# Patient Record
Sex: FEMALE | Race: WHITE | URBAN - METROPOLITAN AREA
[De-identification: names, ages, dates, MRNs, and addresses within clinical notes are randomized per-mention and may not be internally consistent; named-entity substitution may affect disease eponyms.]

---

## 2020-04-15 ENCOUNTER — OFFICE VISIT (OUTPATIENT)
Dept: OBGYN CLINIC | Age: 35
End: 2020-04-15
Payer: COMMERCIAL

## 2020-04-15 VITALS
HEART RATE: 64 BPM | WEIGHT: 293 LBS | BODY MASS INDEX: 45.99 KG/M2 | SYSTOLIC BLOOD PRESSURE: 122 MMHG | DIASTOLIC BLOOD PRESSURE: 78 MMHG | HEIGHT: 67 IN

## 2020-04-15 PROCEDURE — 99385 PREV VISIT NEW AGE 18-39: CPT | Performed by: OBSTETRICS & GYNECOLOGY

## 2020-04-15 ASSESSMENT — PATIENT HEALTH QUESTIONNAIRE - PHQ9
SUM OF ALL RESPONSES TO PHQ QUESTIONS 1-9: 0
SUM OF ALL RESPONSES TO PHQ9 QUESTIONS 1 & 2: 0
SUM OF ALL RESPONSES TO PHQ QUESTIONS 1-9: 0
1. LITTLE INTEREST OR PLEASURE IN DOING THINGS: 0
2. FEELING DOWN, DEPRESSED OR HOPELESS: 0

## 2020-04-15 NOTE — PROGRESS NOTES
acne  ?Laser therapy or electrolysis to remove extra hair  Is there anything I can do on my own to treat the condition? -- Yes. If you are overweight or obese, losing weight can improve many of your symptoms. Losing just 5 percent of your body weight can help a lot. That adds up to 10 pounds of weight loss for a 200-pound woman. What if I want to get pregnant? -- Don't lose hope. Most women with PCOS are able to get pregnant, but it can take a while. If you are overweight, losing weight can help make your periods regular and improve your chances of getting pregnant. If you lose weight but your periods are still irregular, your doctor can give you medicines to help you ovulate and improve your chances of getting pregnant. What will my life be like? -- Women with PCOS are able to live normal lives. But it is important to see a doctor. Treatments will help your symptoms and protect you from other diseases. Obesity Counseling:  Given  Smoking Counseling:  N/A  STD counseling: Discussed safe sexual practice in detail    Orders Placed This Encounter   Procedures    C.trachomatis N.gonorrhoeae DNA     Standing Status:   Future     Standing Expiration Date:   4/15/2021     Order Specific Question:   Source: Answer:   Cervical    PAP SMEAR     Standing Status:   Future     Standing Expiration Date:   4/15/2021     Order Specific Question:   Collection Type     Answer: Thin Prep     Order Specific Question:   Prior Abnormal Pap Test     Answer:   No     Order Specific Question:   Screening or Diagnostic     Answer:   Screening     Order Specific Question:   HPV Requested? Answer:   Yes     Order Specific Question:   High Risk Patient     Answer:   N/A       No orders of the defined types were placed in this encounter. Follow up:  Return if symptoms worsen or fail to improve, for next annual exam visit.       Audrie Aschoff, MD

## 2020-04-22 LAB
CHLAMYDIA TRACHOMATIS AMPLIFIED DET: NEGATIVE
N GONORRHOEAE AMPLIFIED DET: NEGATIVE
PAP SMEAR: ABNORMAL

## 2020-05-05 ENCOUNTER — PROCEDURE VISIT (OUTPATIENT)
Dept: OBGYN CLINIC | Age: 35
End: 2020-05-05
Payer: COMMERCIAL

## 2020-05-05 VITALS
DIASTOLIC BLOOD PRESSURE: 76 MMHG | WEIGHT: 293 LBS | HEIGHT: 67 IN | SYSTOLIC BLOOD PRESSURE: 108 MMHG | BODY MASS INDEX: 45.99 KG/M2 | HEART RATE: 72 BPM

## 2020-05-05 DIAGNOSIS — Z11.3 SCREENING FOR STD (SEXUALLY TRANSMITTED DISEASE): ICD-10-CM

## 2020-05-05 LAB
HCG, URINE, POC: NEGATIVE
HEPATITIS B SURFACE ANTIGEN INTERPRETATION: NORMAL
HEPATITIS C ANTIBODY INTERPRETATION: NORMAL
Lab: NORMAL
NEGATIVE QC PASS/FAIL: NORMAL
POSITIVE QC PASS/FAIL: NORMAL

## 2020-05-05 PROCEDURE — 36415 COLL VENOUS BLD VENIPUNCTURE: CPT | Performed by: OBSTETRICS & GYNECOLOGY

## 2020-05-05 PROCEDURE — 57454 BX/CURETT OF CERVIX W/SCOPE: CPT | Performed by: OBSTETRICS & GYNECOLOGY

## 2020-05-05 PROCEDURE — 81025 URINE PREGNANCY TEST: CPT | Performed by: OBSTETRICS & GYNECOLOGY

## 2020-05-05 NOTE — PROGRESS NOTES
Colposcopy Procedure Note    Indications: Pap smear : LGSIL with + HPV . Procedure Details   The risks and benefits of the procedure and Verbal informed consent obtained. Speculum placed in vagina and excellent visualization of cervix achieved by colposcope, cervix swabbed x 3 with acetic acid solution. Findings:  Cervix: visible awl lesion(s) at 10 , 3, 6 , 9  o'clock; specimen labelled and sent to pathology. Specimens: AS ABOVE WITH Endocervical currettings. Complications: none. Plan:  Specimens labelled and sent to Pathology. Follow up:  Return in about 2 weeks (around 5/19/2020) for F/U for results.       René Bravo MD

## 2020-05-06 LAB — RPR: NORMAL

## 2020-05-07 LAB — HIV 1,2 COMBO ANTIGEN/ANTIBODY: NEGATIVE

## 2020-05-19 ENCOUNTER — OFFICE VISIT (OUTPATIENT)
Dept: OBGYN CLINIC | Age: 35
End: 2020-05-19
Payer: COMMERCIAL

## 2020-05-19 VITALS
DIASTOLIC BLOOD PRESSURE: 76 MMHG | SYSTOLIC BLOOD PRESSURE: 110 MMHG | WEIGHT: 293 LBS | HEART RATE: 80 BPM | BODY MASS INDEX: 45.99 KG/M2 | HEIGHT: 67 IN

## 2020-05-19 PROCEDURE — G8417 CALC BMI ABV UP PARAM F/U: HCPCS | Performed by: OBSTETRICS & GYNECOLOGY

## 2020-05-19 PROCEDURE — G8427 DOCREV CUR MEDS BY ELIG CLIN: HCPCS | Performed by: OBSTETRICS & GYNECOLOGY

## 2020-05-19 PROCEDURE — 99213 OFFICE O/P EST LOW 20 MIN: CPT | Performed by: OBSTETRICS & GYNECOLOGY

## 2020-05-19 PROCEDURE — 1036F TOBACCO NON-USER: CPT | Performed by: OBSTETRICS & GYNECOLOGY

## 2020-05-19 NOTE — PROGRESS NOTES
GLANDULAR FRAGMENTS ADMIXED WITH MUCIN AND BLOOD.   RARE MINUTE FRAGMENT OF METAPLASTIC SQUAMOUS EPITHELIUM WITH FOCAL  CHANGES            SUGGESTIVE OF HPV. Assessment:      Diagnosis Orders   1. Low grade squamous intraepithelial lesion on cytologic smear of cervix (LGSIL)     2. Cervical high risk HPV (human papillomavirus) test positive            Plan:     Results discussed  Advised to follow in 1 year for repeat pap smear . No orders of the defined types were placed in this encounter. No orders of the defined types were placed in this encounter. Follow up:  No follow-ups on file. Risks, benefits and alternative therapies fortreatment discussed. Pt elects expectant management for therapy.          Miguelangel Gomes MD

## 2021-01-05 ENCOUNTER — PATIENT MESSAGE (OUTPATIENT)
Dept: OBGYN CLINIC | Age: 36
End: 2021-01-05

## 2021-01-05 NOTE — TELEPHONE ENCOUNTER
From: Joanne Wilson  To: Susan Dyer MD  Sent: 1/5/2021 5:50 AM EST  Subject: Visit Follow-Up Question    Good morning. I know last time I was there you mentioned me getting the weight loss surgery. I've given it some thought and I would like to start the process ASAP. Please contact me at your earliest convenience via here or phone call [phone].      Thank you

## 2021-07-06 ENCOUNTER — OFFICE VISIT (OUTPATIENT)
Dept: OBGYN CLINIC | Age: 36
End: 2021-07-06
Payer: COMMERCIAL

## 2021-07-06 VITALS
WEIGHT: 293 LBS | HEIGHT: 67 IN | BODY MASS INDEX: 45.99 KG/M2 | SYSTOLIC BLOOD PRESSURE: 108 MMHG | HEART RATE: 72 BPM | DIASTOLIC BLOOD PRESSURE: 62 MMHG

## 2021-07-06 DIAGNOSIS — Z01.419 VISIT FOR GYNECOLOGIC EXAMINATION: Primary | ICD-10-CM

## 2021-07-06 DIAGNOSIS — Z11.51 SCREENING FOR HPV (HUMAN PAPILLOMAVIRUS): ICD-10-CM

## 2021-07-06 PROCEDURE — 99395 PREV VISIT EST AGE 18-39: CPT | Performed by: OBSTETRICS & GYNECOLOGY

## 2021-07-06 NOTE — PROGRESS NOTES
Subjective:      Lyndon Cr is a 31 y. o.female [employer]. here for routine exam. Previously diagnosed with PCOS. Current Complaints: normal menses, no abnormal bleeding, pelvic pain or discharge, no breast pain or new or enlarging lumps on self exam.    Menstrual history:   irregular menses   Sexual activity:  yes, denies knowledge of risky exposure  Abnormal vaginaldischarge:  No  Contraceptive method:  none    Vitals:  /62 (Site: Right Upper Arm, Position: Sitting, Cuff Size: Large Adult)   Pulse 72   Ht 5' 7\" (1.702 m)   Wt (!) 339 lb (153.8 kg)   BMI 53.09 kg/m²   Allergies:  Amoxicillin, Bee pollen, and Penicillins     Past Medical History:   Diagnosis Date    HPV in female     PCOS (polycystic ovarian syndrome)      Past Surgical History:   Procedure Laterality Date    TONSILLECTOMY       No family history on file. Social History     Socioeconomic History    Marital status: Single     Spouse name: Not on file    Number of children: Not on file    Years of education: Not on file    Highest education level: Not on file   Occupational History    Not on file   Tobacco Use    Smoking status: Never Smoker    Smokeless tobacco: Never Used   Substance and Sexual Activity    Alcohol use: Not on file    Drug use: Not on file    Sexual activity: Not on file   Other Topics Concern    Not on file   Social History Narrative    Not on file     Social Determinants of Health     Financial Resource Strain:     Difficulty of Paying Living Expenses:    Food Insecurity:     Worried About Running Out of Food in the Last Year:     [address] in the Last Year:    Transportation Needs:     Lack of Transportation (Medical):      Lack of Transportation (Non-Medical):    Physical Activity:     Days of Exercise per Week:     Minutes of Exercise per Session:    Stress:     Feeling of Stress :    Social Connections:     Frequency of Communication with Friends and Family:     Frequency of Social Gatherings with Friends and Family:     Attends Congregation Services:     Active Member of Clubs or Organizations:     Attends Club or Organization Meetings:     Marital Status:    Intimate Partner Violence:     Fear of Current or Ex-Partner:     Emotionally Abused:     Physically Abused:     Sexually Abused:        Gynecologic History  No LMP recorded. (Menstrual status: Irregular periods). Last Pap: 5 yo  Results: normal  Last Mammogram: Not Indicated Results: N/A  FH breast cancer : denies     OB History        0    Para   0    Term   0       0    AB   0    Living   0       SAB   0    TAB   0    Ectopic   0    Molar   0    Multiple   0    Live Births   0              Patient's medications, allergies, past medical, surgical, social and family histories were reviewed and updated as appropriate. Review of Systems  Review of Systems  Review of Systems   Constitutional: Negative for chills and fever. Respiratory: Negative for cough and shortnessof breath. Cardiovascular: Negative for chest pain and palpitations. Gastrointestinal: Negative for nausea and vomiting. Genitourinary: Negative for dysuria,frequency and urgency. Musculoskeletal: Negative for myalgias. Neurological: Negative for dizziness, seizures and headaches. Psychiatric/Behavioral: Negative for depression and suicidal ideas. All other systemsreviewed and are negative. Objective:     Vitals:  /62 (Site: Right Upper Arm, Position: Sitting, Cuff Size: Large Adult)   Pulse 72   Ht 5' 7\" (1.702 m)   Wt (!) 339 lb (153.8 kg)   BMI 53.09 kg/m²     Physical Exam  Physical Exam  Physical Exam   Constitutional: She is oriented to person,place, and time and well-developed, well-nourished, and in no distress. HENT:   Head: Normocephalic and atraumatic. Eyes: Conjunctivae are normal. Pupils are equal, round, andreactive to light. Neck: Normal range of motion. Neck supple.    Cardiovascular: Normal rate and regular rhythm. Pulmonary/Chest: Effort normal and breath soundsnormal. No respiratory distress. Right breast exhibits no inverted nipple, no mass, no nipple discharge, no skin change and no tenderness. Left breast exhibits no inverted nipple, no mass, no nipple discharge, no skin changeand no tenderness. Breasts are symmetrical.   Abdominal: Soft. Bowel sounds are normal.   Genitourinary: Vagina normal, uterus normal and cervix normal. No vaginal discharge found. Musculoskeletal: Normal range of motion. Neurological: She is alert and oriented to person, place, and time. She has normal reflexes. Psychiatric: Memory, affect and judgment normal.       Assessment:      Diagnosis Orders   1. Visit for gynecologic examination  PAP SMEAR   2. Screening for HPV (human papillomavirus)  PAP SMEAR       Body mass index is 53.09 kg/m². Obesity:  Severe obesity   Smoking:  No    Plan:   Pap smear : indicated:  performed. Breast exam : Normal  STD work up : Not indicated  Discussed bariatric surgery - currently going through the process at [facility] . Counseled about PCOS : What is polycystic ovary syndrome?  Polycystic ovary syndrome is a condition that can cause women to have irregular periods, get acne (oily skin and pimples), grow extra facial hair, or lose hair from their head. The condition can also make it hard to get pregnant. People sometimes call polycystic ovary syndrome \"PCOS. \" It is very common  about 5 percent of all women have PCOS. Most women with PCOS are overweight or obese. What causes PCOS?  In women with PCOS, the ovaries don't work very well. About once a month, the ovaries are supposed to make a structure called a follicle . As the follicle grows, it makes hormones. Then, it releases an egg. This is called \"ovulation. \"  But in women with PCOS, the ovary makes many small follicles instead of one big one. Hormone levels can get out of balance.  And ovulation doesn't happen every month the way it is supposed to. Doctors aren't sure why this happens to some women. What are the symptoms of PCOS?  Women with the condition might:  ?Have fewer than 8 periods a year  ? Grow thick, dark hair in places where only men tend to grow hair, such as on the upper lip, chin, sideburn area, chest, and belly  ? Gain weight and become obese  ? Have acne (oily skin and pimples on their face)  ? Lose hair from their head like men do  ? Have trouble getting pregnant without medical help  Should I see a doctor or nurse, even if my symptoms are mild?  Yes. Women with PCOS are more likely to have other health problems, too. These include:  ?Diabetes (high blood sugar)  ? High cholesterol levels  ? Sleep apnea, a sleep disorder that causes people to briefly stop breathing while they sleep  The risk of heart disease might also be higher in women with PCOS, but more research is needed for doctors to be sure. Are there tests I should have?   Possible tests include:  ? Blood tests to measure levels of hormones, blood sugar, and cholesterol  ? A pregnancy test if you have missed any periods  ? Pelvic ultrasound  This test uses sound waves to make a picture of your uterus and ovaries. Doctors sometimes use this test to help figure out if you have polycystic ovaries. How is PCOS treated?  The most common treatment is to take birth control pills. The pills don't cure PCOS. But they can improve many of its symptoms, like irregular periods, acne, and facial hair. Birth control pills also protect women from cancer of the uterus. Other treatments for symptoms of PCOS are:  ? Anti-androgens  These medicines block hormones that cause some PCOS symptoms. Spironolactone (brand name: Aldactone) is the anti-androgen that many doctors use. ?Progestin  This hormone can make your periods regular, but only if you take it every month. It also lowers the risk of cancer.  Most doctors use medroxyprogesterone (brand name: Provera) or natural progesterone (brand name: Colton Combe). ? Metformin (brand name: Glucophage)  This medicine can help make a woman's periods more regular. But it works only in about half of the women who try it. In women with diabetes, this medicine helps keep blood sugar levels normal.  ?Medicated skin lotion or antibiotics to treat acne  ? Laser therapy or electrolysis to remove extra hair  Is there anything I can do on my own to treat the condition?  Yes. If you are overweight or obese, losing weight can improve many of your symptoms. Losing just 5 percent of your body weight can help a lot. That adds up to 10 pounds of weight loss for a 200-pound woman. What if I want to get pregnant?  Don't lose hope. Most women with PCOS are able to get pregnant, but it can take a while. If you are overweight, losing weight can help make your periods regular and improve your chances of getting pregnant. If you lose weight but your periods are still irregular, your doctor can give you medicines to help you ovulate and improve your chances of getting pregnant. What will my life be like?  Women with PCOS are able to live normal lives. But it is important to see a doctor. Treatments will help your symptoms and protect you from other diseases. Obesity Counseling:  Given  Smoking Counseling:  N/A  STD counseling: Discussed safe sexual practice in detail    Orders Placed This Encounter   Procedures    PAP SMEAR     Standing Status:   Future     Standing Expiration Date:   7/6/2022     Order Specific Question:   Collection Type     Answer: Thin Prep     Order Specific Question:   Prior Abnormal Pap Test     Answer:   No     Order Specific Question:   Screening or Diagnostic     Answer:   Screening     Order Specific Question:   HPV Requested? Answer:   Yes     Order Specific Question:   High Risk Patient     Answer:   N/A       No orders of the defined types were placed in this encounter.       Follow up:  No follow-ups on file.      Dino Washburn MD

## 2021-07-20 LAB — PAP SMEAR: ABNORMAL

## 2021-08-31 ENCOUNTER — PROCEDURE VISIT (OUTPATIENT)
Dept: OBGYN CLINIC | Age: 36
End: 2021-08-31
Payer: COMMERCIAL

## 2021-08-31 VITALS
HEIGHT: 67 IN | BODY MASS INDEX: 45.99 KG/M2 | HEART RATE: 76 BPM | SYSTOLIC BLOOD PRESSURE: 122 MMHG | DIASTOLIC BLOOD PRESSURE: 82 MMHG | WEIGHT: 293 LBS

## 2021-08-31 DIAGNOSIS — R87.612 LGSIL ON PAP SMEAR OF CERVIX: Primary | ICD-10-CM

## 2021-08-31 DIAGNOSIS — R87.810 CERVICAL HIGH RISK HPV (HUMAN PAPILLOMAVIRUS) TEST POSITIVE: ICD-10-CM

## 2021-08-31 PROCEDURE — 57454 BX/CURETT OF CERVIX W/SCOPE: CPT | Performed by: OBSTETRICS & GYNECOLOGY

## 2021-08-31 RX ORDER — EPINEPHRINE 0.3 MG/.3ML
0.3 INJECTION SUBCUTANEOUS PRN
COMMUNITY

## 2021-08-31 NOTE — PROGRESS NOTES
Colposcopy Procedure Note    Indications: Pap smear : LGSIL with + HPV     Procedure Details   The risks and benefits of the procedure and Verbal informed consent obtained. Speculum placed in vagina and excellent visualization of cervix achieved by colposcope, cervix swabbed x 3 with acetic acid solution. Findings:  Cervix: visible lesion(s) at 9 o'clock; specimen labelled and sent to pathology. Specimens: cervical biopsy with ECC     Complications: none. Plan:  Specimens labelled and sent to Pathology. Follow up:  Return in about 2 weeks (around 9/14/2021) for F/U for results.       Cydney Beckham MD

## 2021-09-14 ENCOUNTER — OFFICE VISIT (OUTPATIENT)
Dept: OBGYN CLINIC | Age: 36
End: 2021-09-14
Payer: COMMERCIAL

## 2021-09-14 VITALS
HEIGHT: 67 IN | WEIGHT: 293 LBS | SYSTOLIC BLOOD PRESSURE: 128 MMHG | DIASTOLIC BLOOD PRESSURE: 84 MMHG | BODY MASS INDEX: 45.99 KG/M2 | HEART RATE: 76 BPM

## 2021-09-14 DIAGNOSIS — R87.612 LGSIL ON PAP SMEAR OF CERVIX: Primary | ICD-10-CM

## 2021-09-14 PROCEDURE — 1036F TOBACCO NON-USER: CPT | Performed by: OBSTETRICS & GYNECOLOGY

## 2021-09-14 PROCEDURE — G8427 DOCREV CUR MEDS BY ELIG CLIN: HCPCS | Performed by: OBSTETRICS & GYNECOLOGY

## 2021-09-14 PROCEDURE — G8417 CALC BMI ABV UP PARAM F/U: HCPCS | Performed by: OBSTETRICS & GYNECOLOGY

## 2021-09-14 PROCEDURE — 99213 OFFICE O/P EST LOW 20 MIN: CPT | Performed by: OBSTETRICS & GYNECOLOGY

## 2021-09-14 NOTE — PROGRESS NOTES
Results Review      Marcus Pollock is a 31y.o. year old female here to discuss colposcopy with cervical biopsy results. PREVIOUS VISIT: LGSIL positive HPV    Vitals:  /84 (Site: Left Upper Arm, Position: Sitting, Cuff Size: Large Adult)   Pulse 76   Ht 5' 7\" (1.702 m)   Wt (!) 333 lb (151 kg)   LMP  (LMP Unknown)   BMI 52.16 kg/m²   Allergies:  Amoxicillin, Bee pollen, and Penicillins  Past Medical History:   Diagnosis Date    HPV in female     PCOS (polycystic ovarian syndrome)      Past Surgical History:   Procedure Laterality Date    TONSILLECTOMY       OB History        0    Para   0    Term   0       0    AB   0    Living   0       SAB   0    TAB   0    Ectopic   0    Molar   0    Multiple   0    Live Births   0               No family history on file. Social History     Socioeconomic History    Marital status: Single     Spouse name: Not on file    Number of children: Not on file    Years of education: Not on file    Highest education level: Not on file   Occupational History    Not on file   Tobacco Use    Smoking status: Never Smoker    Smokeless tobacco: Never Used   Substance and Sexual Activity    Alcohol use: Not on file    Drug use: Not on file    Sexual activity: Not on file   Other Topics Concern    Not on file   Social History Narrative    Not on file     Social Determinants of Health     Financial Resource Strain:     Difficulty of Paying Living Expenses:    Food Insecurity:     Worried About Running Out of Food in the Last Year:     [address] in the Last Year:    Transportation Needs:     Lack of Transportation (Medical):      Lack of Transportation (Non-Medical):    Physical Activity:     Days of Exercise per Week:     Minutes of Exercise per Session:    Stress:     Feeling of Stress :    Social Connections:     Frequency of Communication with Friends and Family:     Frequency of Social Gatherings with Friends and Family:     Attends [employer] Services:     Active Member of Clubs or Organizations:     Attends Club or Organization Meetings:     Marital Status:    Intimate Partner Violence:     Fear of Current or Ex-Partner:     Emotionally Abused:     Physically Abused:     Sexually Abused:        Contraceptive method:  none    Patient's medications, allergies, past medical, surgical,social and family histories were reviewed and updated as appropriate. Review of Systems  Review of Systems   All other systems reviewed and are negative. Physical exam :   General Appearance: alert and oriented to person, place and time, well-developedand well-nourished, in no acute distress  Skin: warm and dry, no rash or erythema  Eyes: pupils equal, round, and reactive to light, extraocular eye movements intact,conjunctivae normal  Neurological : A & O X 3   Pelvic: Deferred    Results:     FINAL DIAGNOSIS:   A.  CERVICAL BIOPSY 9 O'CLOCK-   SUGGESTIVE OF FOCAL HPV CHANGES (LSIL). CHRONIC CERVICITIS SEE COMMENT. B. [facility] OF ECTOCERVICAL SQUAMOUS EPITHELIUM WITH FOCAL MODERATE   DYSPLASIA (HSIL). CHRONIC ENDOCERVICITIS WITH SQUAMOUS METAPLASIA, NEGATIVE FOR DYSPLASIA. SEE COMMENT. Results for orders placed or performed in visit on 07/06/21   PAP SMEAR   Result Value Ref Range    Pap Epithelial cell abnormality (A) Negative for intraephithelial lesion or malignancy, Other      No results found. Assessment:      Diagnosis Orders   1. LGSIL on Pap smear of cervix            Plan:     Results discussed  Patient follow-up in 1 year for repeat Pap smear with HPV    No orders of the defined types were placed in this encounter. No orders of the defined types were placed in this encounter. Follow up:  Return in about 1 year (around 9/14/2022) for next annual exam visit. Risks, benefits and alternative therapies fortreatment discussed. Pt elects expectant management for therapy.          Anahy Griffith MD

## 2022-11-23 ENCOUNTER — OFFICE VISIT (OUTPATIENT)
Dept: OBGYN CLINIC | Age: 37
End: 2022-11-23
Payer: COMMERCIAL

## 2022-11-23 VITALS
SYSTOLIC BLOOD PRESSURE: 108 MMHG | HEIGHT: 67 IN | WEIGHT: 244 LBS | HEART RATE: 68 BPM | DIASTOLIC BLOOD PRESSURE: 64 MMHG | BODY MASS INDEX: 38.3 KG/M2

## 2022-11-23 DIAGNOSIS — Z01.419 VISIT FOR GYNECOLOGIC EXAMINATION: Primary | ICD-10-CM

## 2022-11-23 DIAGNOSIS — Z11.51 SCREENING FOR HPV (HUMAN PAPILLOMAVIRUS): ICD-10-CM

## 2022-11-23 PROCEDURE — 99395 PREV VISIT EST AGE 18-39: CPT | Performed by: OBSTETRICS & GYNECOLOGY

## 2022-11-23 RX ORDER — AMOXICILLIN 250 MG
CAPSULE ORAL
COMMUNITY

## 2022-11-23 NOTE — PROGRESS NOTES
Subjective:      Rosemary Sam is a 30 y. o.female [employer]. here for routine exam. Previously diagnosed with PCOS. Current Complaints: normal menses, no abnormal bleeding, pelvic pain or discharge, no breast pain or new or enlarging lumps on self exam.    Menstrual history:   irregular menses became regular after completing bariatric surgery in 2/2022 and lost 100lbs , mentions cycles are regular now.    Sexual activity:  yes, denies knowledge of risky exposure  Abnormal vaginaldischarge:  No  Contraceptive method:  none    Vitals:  /64 (Site: Left Upper Arm, Position: Sitting, Cuff Size: Large Adult)   Pulse 68   Ht 5' 7\" (1.702 m)   Wt 244 lb (110.7 kg)   BMI 38.22 kg/m²   Allergies:  Amoxicillin, Bee pollen, and Penicillins     Past Medical History:   Diagnosis Date    Abnormal Pap smear of cervix     HPV in female     PCOS (polycystic ovarian syndrome)      Past Surgical History:   Procedure Laterality Date    COLPOSCOPY      GASTRIC BYPASS SURGERY  02/2022    TONSILLECTOMY       Family History   Problem Relation Age of Onset    Diabetes Maternal Grandfather     Diabetes Maternal Grandmother      Social History     Socioeconomic History    Marital status: Single     Spouse name: Not on file    Number of children: Not on file    Years of education: Not on file    Highest education level: Not on file   Occupational History    Not on file   Tobacco Use    Smoking status: Never    Smokeless tobacco: Never   Substance and Sexual Activity    Alcohol use: Not Currently     Comment: Have not had a drink since last year    Drug use: Never    Sexual activity: Yes     Partners: Male   Other Topics Concern    Not on file   Social History Narrative    Not on file     Social Determinants of Health     Financial Resource Strain: Not on file   Food Insecurity: Not on file   Transportation Needs: Not on file   Physical Activity: Not on file   Stress: Not on file   Social Connections: Not on file   Intimate Partner Violence: Not on file   Housing Stability: Not on file       Gynecologic History  No LMP recorded. (Menstrual status: Irregular periods). Last Pap: 5 yo  Results: LGSIL with + HPV   Last Mammogram: Not Indicated Results: N/A  FH breast cancer : denies     OB History          0    Para   0    Term   0       0    AB   0    Living   0         SAB   0    IAB   0    Ectopic   0    Molar   0    Multiple   0    Live Births   0              Patient's medications, allergies, past medical, surgical, social and family histories were reviewed and updated as appropriate. Review of Systems  Review of Systems  Review of Systems   Constitutional: Negative for chills and fever. Respiratory: Negative for cough and shortnessof breath. Cardiovascular: Negative for chest pain and palpitations. Gastrointestinal: Negative for nausea and vomiting. Genitourinary: Negative for dysuria,frequency and urgency. Musculoskeletal: Negative for myalgias. Neurological: Negative for dizziness, seizures and headaches. Psychiatric/Behavioral: Negative for depression and suicidal ideas. All other systemsreviewed and are negative. Objective:     Vitals:  /64 (Site: Left Upper Arm, Position: Sitting, Cuff Size: Large Adult)   Pulse 68   Ht 5' 7\" (1.702 m)   Wt 244 lb (110.7 kg)   BMI 38.22 kg/m²     Physical Exam  Physical Exam  Physical Exam   Constitutional: She is oriented to person,place, and time and well-developed, well-nourished, and in no distress. HENT:   Head: Normocephalic and atraumatic. Eyes: Conjunctivae are normal. Pupils are equal, round, andreactive to light. Neck: Normal range of motion. Neck supple. Cardiovascular: Normal rate and regular rhythm. Pulmonary/Chest: Effort normal and breath soundsnormal. No respiratory distress. Right breast exhibits no inverted nipple, no mass, no nipple discharge, no skin change and no tenderness.  Left breast exhibits no inverted nipple, no mass, no nipple discharge, no skin changeand no tenderness. Breasts are symmetrical.   Abdominal: Soft. Bowel sounds are normal.   Genitourinary: Vagina normal, uterus normal and cervix normal. No vaginal discharge found. Musculoskeletal: Normal range of motion. Neurological: She is alert and oriented to person, place, and time. She has normal reflexes. Psychiatric: Memory, affect and judgment normal.       Assessment:      Diagnosis Orders   1. Visit for gynecologic examination  PAP SMEAR      2. Screening for HPV (human papillomavirus)  PAP SMEAR          Body mass index is 38.22 kg/m². Obesity:  Severe obesity   Smoking:  No    Plan:   Pap smear : indicated:  performed. Breast exam : Normal  STD work up : Not indicated  HX of PCOS resolved after bariatric surgery and > 100lbs WEIGHT LOSS. Obesity Counseling:  Given  Smoking Counseling:  N/A  STD counseling: Discussed safe sexual practice in detail    Orders Placed This Encounter   Procedures    PAP SMEAR     Standing Status:   Future     Standing Expiration Date:   11/22/2023     Order Specific Question:   Collection Type     Answer: Thin Prep     Order Specific Question:   Prior Abnormal Pap Test     Answer:   No     Order Specific Question:   Screening or Diagnostic     Answer:   Screening     Order Specific Question:   HPV Requested? Answer:   Yes     Order Specific Question:   High Risk Patient     Answer:   N/A       No orders of the defined types were placed in this encounter. Follow up:  No follow-ups on file.       Devora Turner MD